# Patient Record
Sex: FEMALE | Race: WHITE | NOT HISPANIC OR LATINO | Employment: UNEMPLOYED | URBAN - NONMETROPOLITAN AREA
[De-identification: names, ages, dates, MRNs, and addresses within clinical notes are randomized per-mention and may not be internally consistent; named-entity substitution may affect disease eponyms.]

---

## 2019-06-26 PROBLEM — R12 HEARTBURN: Status: ACTIVE | Noted: 2019-06-26

## 2019-06-26 PROBLEM — R10.84 GENERALIZED ABDOMINAL PAIN: Status: ACTIVE | Noted: 2019-06-26

## 2019-06-26 PROBLEM — R14.0 BLOATING: Status: ACTIVE | Noted: 2019-06-26

## 2023-08-17 ENCOUNTER — TRANSCRIBE ORDERS (OUTPATIENT)
Dept: ADMINISTRATIVE | Facility: HOSPITAL | Age: 33
End: 2023-08-17
Payer: COMMERCIAL

## 2023-08-17 ENCOUNTER — LAB (OUTPATIENT)
Dept: LAB | Facility: HOSPITAL | Age: 33
End: 2023-08-17
Payer: COMMERCIAL

## 2023-08-17 ENCOUNTER — HOSPITAL ENCOUNTER (OUTPATIENT)
Dept: RESPIRATORY THERAPY | Facility: HOSPITAL | Age: 33
Discharge: HOME | End: 2023-08-17
Payer: COMMERCIAL

## 2023-08-17 DIAGNOSIS — E78.5 HYPERLIPIDEMIA, UNSPECIFIED HYPERLIPIDEMIA TYPE: ICD-10-CM

## 2023-08-17 DIAGNOSIS — R00.0 RACING HEART BEAT: ICD-10-CM

## 2023-08-17 DIAGNOSIS — E55.9 VITAMIN D DEFICIENCY: ICD-10-CM

## 2023-08-17 DIAGNOSIS — R00.0 RACING HEART BEAT: Primary | ICD-10-CM

## 2023-08-17 DIAGNOSIS — D50.9 IRON DEFICIENCY ANEMIA, UNSPECIFIED IRON DEFICIENCY ANEMIA TYPE: ICD-10-CM

## 2023-08-17 LAB
BASOPHILS # BLD AUTO: 0.03 10*3/MM3 (ref 0–0.2)
BASOPHILS NFR BLD AUTO: 0.3 % (ref 0–1.5)
CHOLEST SERPL-MCNC: 226 MG/DL (ref 0–200)
DEPRECATED RDW RBC AUTO: 38.2 FL (ref 37–54)
EOSINOPHIL # BLD AUTO: 0.15 10*3/MM3 (ref 0–0.4)
EOSINOPHIL NFR BLD AUTO: 1.7 % (ref 0.3–6.2)
ERYTHROCYTE [DISTWIDTH] IN BLOOD BY AUTOMATED COUNT: 11.9 % (ref 12.3–15.4)
FERRITIN SERPL-MCNC: 26.37 NG/ML (ref 13–150)
HBA1C MFR BLD: 5 % (ref 4.8–5.6)
HCT VFR BLD AUTO: 38.3 % (ref 34–46.6)
HDLC SERPL-MCNC: 65 MG/DL (ref 40–60)
HGB BLD-MCNC: 12.5 G/DL (ref 12–15.9)
IMM GRANULOCYTES # BLD AUTO: 0.04 10*3/MM3 (ref 0–0.05)
IMM GRANULOCYTES NFR BLD AUTO: 0.5 % (ref 0–0.5)
IRON 24H UR-MRATE: 126 MCG/DL (ref 37–145)
IRON SATN MFR SERPL: 21 % (ref 20–50)
LDLC SERPL CALC-MCNC: 138 MG/DL (ref 0–100)
LDLC/HDLC SERPL: 2.08 {RATIO}
LYMPHOCYTES # BLD AUTO: 1.75 10*3/MM3 (ref 0.7–3.1)
LYMPHOCYTES NFR BLD AUTO: 19.7 % (ref 19.6–45.3)
MCH RBC QN AUTO: 28.4 PG (ref 26.6–33)
MCHC RBC AUTO-ENTMCNC: 32.6 G/DL (ref 31.5–35.7)
MCV RBC AUTO: 87 FL (ref 79–97)
MONOCYTES # BLD AUTO: 0.43 10*3/MM3 (ref 0.1–0.9)
MONOCYTES NFR BLD AUTO: 4.8 % (ref 5–12)
NEUTROPHILS NFR BLD AUTO: 6.47 10*3/MM3 (ref 1.7–7)
NEUTROPHILS NFR BLD AUTO: 73 % (ref 42.7–76)
NRBC BLD AUTO-RTO: 0 /100 WBC (ref 0–0.2)
PLATELET # BLD AUTO: 420 10*3/MM3 (ref 140–450)
PMV BLD AUTO: 9.3 FL (ref 6–12)
RBC # BLD AUTO: 4.4 10*6/MM3 (ref 3.77–5.28)
TIBC SERPL-MCNC: 589 MCG/DL (ref 298–536)
TRANSFERRIN SERPL-MCNC: 395 MG/DL (ref 200–360)
TRIGL SERPL-MCNC: 128 MG/DL (ref 0–150)
TSH SERPL DL<=0.05 MIU/L-ACNC: 1.32 UIU/ML (ref 0.27–4.2)
VLDLC SERPL-MCNC: 23 MG/DL (ref 5–40)
WBC NRBC COR # BLD: 8.87 10*3/MM3 (ref 3.4–10.8)

## 2023-08-17 PROCEDURE — 93242 EXT ECG>48HR<7D RECORDING: CPT

## 2023-08-17 PROCEDURE — 84443 ASSAY THYROID STIM HORMONE: CPT

## 2023-08-17 PROCEDURE — 80061 LIPID PANEL: CPT

## 2023-08-17 PROCEDURE — 83540 ASSAY OF IRON: CPT

## 2023-08-17 PROCEDURE — 82728 ASSAY OF FERRITIN: CPT

## 2023-08-17 PROCEDURE — 36415 COLL VENOUS BLD VENIPUNCTURE: CPT

## 2023-08-17 PROCEDURE — 82306 VITAMIN D 25 HYDROXY: CPT

## 2023-08-17 PROCEDURE — 85025 COMPLETE CBC W/AUTO DIFF WBC: CPT

## 2023-08-17 PROCEDURE — 83036 HEMOGLOBIN GLYCOSYLATED A1C: CPT

## 2023-08-17 PROCEDURE — 84466 ASSAY OF TRANSFERRIN: CPT

## 2023-08-18 LAB — 25(OH)D3 SERPL-MCNC: 46.2 NG/ML (ref 30–100)

## 2023-12-12 ENCOUNTER — OFFICE VISIT (OUTPATIENT)
Dept: CARDIOLOGY | Facility: CLINIC | Age: 33
End: 2023-12-12
Payer: COMMERCIAL

## 2023-12-12 VITALS
DIASTOLIC BLOOD PRESSURE: 72 MMHG | SYSTOLIC BLOOD PRESSURE: 118 MMHG | OXYGEN SATURATION: 100 % | BODY MASS INDEX: 29.06 KG/M2 | RESPIRATION RATE: 16 BRPM | HEIGHT: 70 IN | WEIGHT: 203 LBS | HEART RATE: 89 BPM

## 2023-12-12 DIAGNOSIS — R00.2 PALPITATIONS: Primary | ICD-10-CM

## 2023-12-12 PROCEDURE — 93000 ELECTROCARDIOGRAM COMPLETE: CPT | Performed by: INTERNAL MEDICINE

## 2023-12-12 PROCEDURE — 99204 OFFICE O/P NEW MOD 45 MIN: CPT | Performed by: INTERNAL MEDICINE

## 2023-12-12 RX ORDER — FEXOFENADINE HCL 180 MG/1
180 TABLET ORAL DAILY
COMMUNITY

## 2023-12-12 NOTE — PROGRESS NOTES
Donna Adamson DO  Brielle Freed  1990  12/12/2023    Patient Active Problem List   Diagnosis    Generalized abdominal pain    Nausea    Bloating    Heartburn       Dear :    Subjective     Brielle Freed is a 33 y.o. female with the problems as listed above, presents    Chief complaint: Recurrent palpitations.    History of Present Illness: Ms. Freed is a pleasant 33-year-old  female with no history of known heart disease or significant cardiac arrhythmias, presents with complaints of having recurrent palpitations for the last few months.  She describes these episodes as being felt as rapid heartbeat and sometimes as a skipped heartbeat.  There is no associated dizziness or syncope.  She had a Holter monitor recently on 8/17/2023 that revealed predominantly sinus rhythm with heart rate ranging from 48 up to 165 bpm with rare PACs and PVCs with no significant bradycardia, AV block or long pauses.  Her symptoms of heart fluttering and racing during the recording correlated with sinus rhythm in the 70s.  She has some intermittent dyspnea with moderate exertion with no PND, orthopnea pedal edema.  She has some occasional localized left-sided chest pains that occur at random and resolve spontaneously.  These are rated as mild.  She admits to drinking about 1 cup of coffee a day.  She states that she has some intermittent anxiety.    Holter Monitor >48 Hrs Up To 7 Days     Accession Number: [id number]   Date of Study: 8/17/23   Ordering Provider: Katelynn Harden APRN   Clinical Indications: Racing heart beat [R00.0 (ICD-10-CM)], Iron deficiency anemia, unspecified iron deficiency anemia type [D50.9 (ICD-10-CM)], Hyperlipidemia, unspecified hyperlipidemia type [E78.5 (ICD-10-CM)], Vitamin D deficiency [E55.9 (ICD-10-CM)]        Interpreting Physicians  Performing Staff   Lee Jackson MD Tech: Nicole Edgar, RRT        Interpretation Summary     The patient was monitored for 7 days.     The predominant rhythm noted during the testing period was sinus rhythm.    Average HR: 80. Min HR: 48. Max HR: 165.    Rare PACs and PVCs    No significant bradycardia, AV block or long pauses.    Symptoms of heart fluttering and racing correlated with sinus rhythm in the 70s.                   Allergies   Allergen Reactions    Codeine Hives   :    Current Outpatient Medications:     fexofenadine (ALLEGRA) 180 MG tablet, Take 1 tablet by mouth Daily., Disp: , Rfl:     omeprazole (priLOSEC) 40 MG capsule, TAKE ONE CAPSULE BY MOUTH ONCE DAILY PRIOR TO THE SAME MEAL EACH DAY , Disp: 30 capsule, Rfl: 11    Past Medical History:   Diagnosis Date    GERD (gastroesophageal reflux disease)     Seasonal allergies      Past Surgical History:   Procedure Laterality Date    COLONOSCOPY N/A 6/27/2019    Procedure: COLONOSCOPY CPT CODE: 86069;  Surgeon: Vamsi Quispe MD;  Location: [facility];  Service: Gastroenterology    CYST REMOVAL      ENDOSCOPY N/A 6/27/2019    Procedure: ESOPHAGOGASTRODUODENOSCOPY WITH BIOPSY CPT CODE: 95540;  Surgeon: Vamsi Quispe MD;  Location: [facility];  Service: Gastroenterology     Family History   Problem Relation Age of Onset    Celiac disease Mother     Seizures Father     No Known Problems Sister     No Known Problems Brother     Heart disease Maternal Grandfather     Heart disease Paternal Grandmother     Heart attack Paternal Grandmother      Social History     Tobacco Use    Smoking status: Never    Smokeless tobacco: Never   Substance Use Topics    Alcohol use: No     Comment: occas    Drug use: Never     Review of Systems   Constitutional: Negative for chills, fever, malaise/fatigue, weight gain and weight loss.   HENT:  Positive for nosebleeds. Negative for congestion.    Eyes:  Positive for visual disturbance.   Cardiovascular:  Positive for palpitations. Negative for chest pain, irregular heartbeat, leg swelling and orthopnea.   Respiratory:  Negative for cough, hemoptysis and  "shortness of breath.    Hematologic/Lymphatic:        Hx anemia   Gastrointestinal:  Negative for abdominal pain, change in bowel habit, hematemesis, melena and vomiting.   Genitourinary:  Negative for dysuria, frequency and hematuria.   Neurological:  Positive for light-headedness, numbness and paresthesias. Negative for focal weakness, headaches and weakness.   Psychiatric/Behavioral:  The patient is nervous/anxious.      Objective   Blood pressure 118/72, pulse 89, resp. rate 16, height 177.8 cm (70\"), weight 92.1 kg (203 lb), SpO2 100%, not currently breastfeeding.  Body mass index is 29.13 kg/m².    Vitals reviewed.   Constitutional:       Appearance: Well-developed.   Eyes:      Conjunctiva/sclera: Conjunctivae normal.   HENT:      Head: Normocephalic.   Neck:      Thyroid: No thyromegaly.      Vascular: No JVD.      Trachea: No tracheal deviation.   Pulmonary:      Effort: No respiratory distress.      Breath sounds: Normal breath sounds. No wheezing. No rales.   Cardiovascular:      PMI at left midclavicular line. Normal rate. Regular rhythm. Normal S1. Normal S2.       Murmurs: There is no murmur.      No gallop.  No click. No rub.   Pulses:     Intact distal pulses.   Edema:     Peripheral edema absent.   Abdominal:      General: Bowel sounds are normal.      Palpations: Abdomen is soft. There is no abdominal mass.      Tenderness: There is no abdominal tenderness.   Musculoskeletal:      Cervical back: Normal range of motion and neck supple. Skin:     General: Skin is warm and dry.   Neurological:      Mental Status: Alert and oriented to person, place, and time.      Cranial Nerves: No cranial nerve deficit.         Lab Results   Component Value Date    WBC 8.87 08/17/2023    HGB 12.5 08/17/2023    HCT 38.3 08/17/2023     08/17/2023       Lab Results   Component Value Date    TSH 1.320 08/17/2023    TRIG 128 08/17/2023    HDL 65 (H) 08/17/2023     (H) 08/17/2023        ECG 12 " Lead    Date/Time: 12/12/2023 12:02 PM  Performed by: Lee Jackson MD    Authorized by: Lee Jackson MD  Previous ECG: no previous ECG available  Rhythm: sinus rhythm  Conduction: conduction normal  ST Segments: ST segments normal  T Waves: T waves normal          Assessment & Plan    Diagnosis Plan   1. Palpitations  Cardiac Event Monitor    Basic Metabolic Panel    Magnesium    TSH+Free T4          Recommendations  Orders Placed This Encounter   Procedures    Basic Metabolic Panel    Magnesium    TSH+Free T4    Cardiac Event Monitor    ECG 12 Lead      Since the Holter monitor was unrevealing, will try event monitor for 4 weeks and see if we could capture any infrequent cardiac arrhythmias.  Check BMP and magnesium level as well as TSH.      Return in about 2 months (around 2/12/2024).    As always, ,  I appreciate very much the opportunity to participate in the cardiovascular care of your patients. Please do not hesitate to call me with any questions with regards to Brielle Freed's evaluation and management.     With Best Regards,        Lee Jackson MD, FACC    Please note that portions of this note were completed with a voice recognition program.

## 2023-12-12 NOTE — LETTER
December 13, 2023     Donna Adamson DO  [address] [place] [address]    Patient: Brielle Freed   YOB: 1990   Date of Visit: 12/12/2023     Dear  :       Thank you for referring Brielle Freed to me for evaluation. Below are the relevant portions of my assessment and plan of care.    If you have questions, please do not hesitate to call me. I look forward to following Brielle along with you.      Sincerely,        Lee Jackson MD        CC: No Recipients    Lee Jackson MD  12/13/23 1437  Sign when Signing Visit  Donna Adamson DO  Brielle Freed  1990  12/12/2023    Patient Active Problem List   Diagnosis   • Generalized abdominal pain   • Nausea   • Bloating   • Heartburn       Dear :    Subjective    Brielle Freed is a 33 y.o. female with the problems as listed above, presents    Chief complaint: Recurrent palpitations.    History of Present Illness: Ms. Freed is a pleasant 33-year-old  female with no history of known heart disease or significant cardiac arrhythmias, presents with complaints of having recurrent palpitations for the last few months.  She describes these episodes as being felt as rapid heartbeat and sometimes as a skipped heartbeat.  There is no associated dizziness or syncope.  She had a Holter monitor recently on 8/17/2023 that revealed predominantly sinus rhythm with heart rate ranging from 48 up to 165 bpm with rare PACs and PVCs with no significant bradycardia, AV block or long pauses.  Her symptoms of heart fluttering and racing during the recording correlated with sinus rhythm in the 70s.  She has some intermittent dyspnea with moderate exertion with no PND, orthopnea pedal edema.  She has some occasional localized left-sided chest pains that occur at random and resolve spontaneously.  These are rated as mild.  She admits to drinking about 1 cup of coffee a day.  She states that she has some intermittent anxiety.    Holter Monitor  >48 Hrs Up To 7 Days     Accession Number: [id number]   Date of Study: 8/17/23   Ordering Provider: Katelynn Harden APRN   Clinical Indications: Racing heart beat [R00.0 (ICD-10-CM)], Iron deficiency anemia, unspecified iron deficiency anemia type [D50.9 (ICD-10-CM)], Hyperlipidemia, unspecified hyperlipidemia type [E78.5 (ICD-10-CM)], Vitamin D deficiency [E55.9 (ICD-10-CM)]        Interpreting Physicians  Performing Staff   Lee Jackson MD Tech: Nicole Edgar, RRT        Interpretation Summary   •  The patient was monitored for 7 days.  •  The predominant rhythm noted during the testing period was sinus rhythm.  •  Average HR: 80. Min HR: 48. Max HR: 165.  •  Rare PACs and PVCs  •  No significant bradycardia, AV block or long pauses.  •  Symptoms of heart fluttering and racing correlated with sinus rhythm in the 70s.                   Allergies   Allergen Reactions   • Codeine Hives   :    Current Outpatient Medications:   •  fexofenadine (ALLEGRA) 180 MG tablet, Take 1 tablet by mouth Daily., Disp: , Rfl:   •  omeprazole (priLOSEC) 40 MG capsule, TAKE ONE CAPSULE BY MOUTH ONCE DAILY PRIOR TO THE SAME MEAL EACH DAY , Disp: 30 capsule, Rfl: 11    Past Medical History:   Diagnosis Date   • GERD (gastroesophageal reflux disease)    • Seasonal allergies      Past Surgical History:   Procedure Laterality Date   • COLONOSCOPY N/A 6/27/2019    Procedure: COLONOSCOPY CPT CODE: 04056;  Surgeon: Vamsi Quispe MD;  Location: [facility] OR;  Service: Gastroenterology   • CYST REMOVAL     • ENDOSCOPY N/A 6/27/2019    Procedure: ESOPHAGOGASTRODUODENOSCOPY WITH BIOPSY CPT CODE: 31755;  Surgeon: Vamsi Quispe MD;  Location: [facility] OR;  Service: Gastroenterology     Family History   Problem Relation Age of Onset   • Celiac disease Mother    • Seizures Father    • No Known Problems Sister    • No Known Problems Brother    • Heart disease Maternal Grandfather    • Heart disease Paternal Grandmother    • Heart attack  "Paternal Grandmother      Social History     Tobacco Use   • Smoking status: Never   • Smokeless tobacco: Never   Substance Use Topics   • Alcohol use: No     Comment: occas   • Drug use: Never     Review of Systems   Constitutional: Negative for chills, fever, malaise/fatigue, weight gain and weight loss.   HENT:  Positive for nosebleeds. Negative for congestion.    Eyes:  Positive for visual disturbance.   Cardiovascular:  Positive for palpitations. Negative for chest pain, irregular heartbeat, leg swelling and orthopnea.   Respiratory:  Negative for cough, hemoptysis and shortness of breath.    Hematologic/Lymphatic:        Hx anemia   Gastrointestinal:  Negative for abdominal pain, change in bowel habit, hematemesis, melena and vomiting.   Genitourinary:  Negative for dysuria, frequency and hematuria.   Neurological:  Positive for light-headedness, numbness and paresthesias. Negative for focal weakness, headaches and weakness.   Psychiatric/Behavioral:  The patient is nervous/anxious.      Objective  Blood pressure 118/72, pulse 89, resp. rate 16, height 177.8 cm (70\"), weight 92.1 kg (203 lb), SpO2 100%, not currently breastfeeding.  Body mass index is 29.13 kg/m².    Vitals reviewed.   Constitutional:       Appearance: Well-developed.   Eyes:      Conjunctiva/sclera: Conjunctivae normal.   HENT:      Head: Normocephalic.   Neck:      Thyroid: No thyromegaly.      Vascular: No JVD.      Trachea: No tracheal deviation.   Pulmonary:      Effort: No respiratory distress.      Breath sounds: Normal breath sounds. No wheezing. No rales.   Cardiovascular:      PMI at left midclavicular line. Normal rate. Regular rhythm. Normal S1. Normal S2.       Murmurs: There is no murmur.      No gallop.  No click. No rub.   Pulses:     Intact distal pulses.   Edema:     Peripheral edema absent.   Abdominal:      General: Bowel sounds are normal.      Palpations: Abdomen is soft. There is no abdominal mass.      Tenderness: There " is no abdominal tenderness.   Musculoskeletal:      Cervical back: Normal range of motion and neck supple. Skin:     General: Skin is warm and dry.   Neurological:      Mental Status: Alert and oriented to person, place, and time.      Cranial Nerves: No cranial nerve deficit.         Lab Results   Component Value Date    WBC 8.87 08/17/2023    HGB 12.5 08/17/2023    HCT 38.3 08/17/2023     08/17/2023       Lab Results   Component Value Date    TSH 1.320 08/17/2023    TRIG 128 08/17/2023    HDL 65 (H) 08/17/2023     (H) 08/17/2023        ECG 12 Lead    Date/Time: 12/12/2023 12:02 PM  Performed by: Lee Jackson MD    Authorized by: Lee Jackson MD  Previous ECG: no previous ECG available  Rhythm: sinus rhythm  Conduction: conduction normal  ST Segments: ST segments normal  T Waves: T waves normal          Assessment & Plan   Diagnosis Plan   1. Palpitations  Cardiac Event Monitor    Basic Metabolic Panel    Magnesium    TSH+Free T4          Recommendations  Orders Placed This Encounter   Procedures   • Basic Metabolic Panel   • Magnesium   • TSH+Free T4   • Cardiac Event Monitor   • ECG 12 Lead      Since the Holter monitor was unrevealing, will try event monitor for 4 weeks and see if we could capture any infrequent cardiac arrhythmias.  Check BMP and magnesium level as well as TSH.      Return in about 2 months (around 2/12/2024).    As always, ,  I appreciate very much the opportunity to participate in the cardiovascular care of your patients. Please do not hesitate to call me with any questions with regards to Brielle Freed's evaluation and management.     With Best Regards,        Lee Jackson MD, [facility]    Please note that portions of this note were completed with a voice recognition program.

## 2023-12-14 ENCOUNTER — PATIENT ROUNDING (BHMG ONLY) (OUTPATIENT)
Dept: CARDIOLOGY | Facility: CLINIC | Age: 33
End: 2023-12-14
Payer: COMMERCIAL

## 2023-12-14 NOTE — PROGRESS NOTES
A [employer] message has been sent to the patient for patient rounding for [facility].

## 2024-01-09 ENCOUNTER — HOSPITAL ENCOUNTER (OUTPATIENT)
Dept: RESPIRATORY THERAPY | Facility: HOSPITAL | Age: 34
Discharge: HOME | End: 2024-01-09
Payer: COMMERCIAL

## 2024-01-09 ENCOUNTER — LAB (OUTPATIENT)
Dept: LAB | Facility: HOSPITAL | Age: 34
End: 2024-01-09
Payer: COMMERCIAL

## 2024-01-09 DIAGNOSIS — R00.2 PALPITATIONS: ICD-10-CM

## 2024-01-09 LAB
ANION GAP SERPL CALCULATED.3IONS-SCNC: 8.5 MMOL/L (ref 5–15)
BUN SERPL-MCNC: 10 MG/DL (ref 6–20)
BUN/CREAT SERPL: 13.5 (ref 7–25)
CALCIUM SPEC-SCNC: 9.8 MG/DL (ref 8.6–10.5)
CHLORIDE SERPL-SCNC: 107 MMOL/L (ref 98–107)
CO2 SERPL-SCNC: 22.5 MMOL/L (ref 22–29)
CREAT SERPL-MCNC: 0.74 MG/DL (ref 0.57–1)
EGFRCR SERPLBLD CKD-EPI 2021: 109 ML/MIN/1.73
GLUCOSE SERPL-MCNC: 83 MG/DL (ref 65–99)
MAGNESIUM SERPL-MCNC: 2.2 MG/DL (ref 1.6–2.6)
POTASSIUM SERPL-SCNC: 4.2 MMOL/L (ref 3.5–5.2)
SODIUM SERPL-SCNC: 138 MMOL/L (ref 136–145)
T4 FREE SERPL-MCNC: 1.37 NG/DL (ref 0.93–1.7)
TSH SERPL DL<=0.05 MIU/L-ACNC: 2.59 UIU/ML (ref 0.27–4.2)

## 2024-01-09 PROCEDURE — 36415 COLL VENOUS BLD VENIPUNCTURE: CPT

## 2024-01-09 PROCEDURE — 84439 ASSAY OF FREE THYROXINE: CPT

## 2024-01-09 PROCEDURE — 84443 ASSAY THYROID STIM HORMONE: CPT

## 2024-01-09 PROCEDURE — 80048 BASIC METABOLIC PNL TOTAL CA: CPT

## 2024-01-09 PROCEDURE — 83735 ASSAY OF MAGNESIUM: CPT

## 2024-02-15 ENCOUNTER — OFFICE VISIT (OUTPATIENT)
Dept: CARDIOLOGY | Facility: CLINIC | Age: 34
End: 2024-02-15
Payer: COMMERCIAL

## 2024-02-15 VITALS
HEIGHT: 70 IN | RESPIRATION RATE: 16 BRPM | SYSTOLIC BLOOD PRESSURE: 110 MMHG | HEART RATE: 89 BPM | BODY MASS INDEX: 28.75 KG/M2 | DIASTOLIC BLOOD PRESSURE: 69 MMHG | OXYGEN SATURATION: 98 % | WEIGHT: 200.8 LBS

## 2024-02-15 DIAGNOSIS — R00.2 PALPITATIONS: Primary | ICD-10-CM

## 2024-02-15 PROCEDURE — 99214 OFFICE O/P EST MOD 30 MIN: CPT | Performed by: INTERNAL MEDICINE

## 2024-02-15 RX ORDER — DESOGESTREL AND ETHINYL ESTRADIOL 21-5 (28)
1 KIT ORAL DAILY
COMMUNITY

## 2025-03-07 ENCOUNTER — OFFICE VISIT (OUTPATIENT)
Dept: CARDIOLOGY | Facility: CLINIC | Age: 35
End: 2025-03-07
Payer: COMMERCIAL

## 2025-03-07 VITALS
WEIGHT: 212 LBS | DIASTOLIC BLOOD PRESSURE: 74 MMHG | BODY MASS INDEX: 30.35 KG/M2 | SYSTOLIC BLOOD PRESSURE: 114 MMHG | HEIGHT: 70 IN | OXYGEN SATURATION: 100 % | HEART RATE: 87 BPM

## 2025-03-07 DIAGNOSIS — R42 DIZZINESS: ICD-10-CM

## 2025-03-07 DIAGNOSIS — R00.2 PALPITATIONS: Primary | ICD-10-CM

## 2025-03-07 PROCEDURE — 99214 OFFICE O/P EST MOD 30 MIN: CPT | Performed by: PHYSICIAN ASSISTANT

## 2025-03-07 RX ORDER — ACETAMINOPHEN AND CODEINE PHOSPHATE 120; 12 MG/5ML; MG/5ML
0.35 SOLUTION ORAL DAILY
COMMUNITY
Start: 2024-11-05

## 2025-03-07 RX ORDER — CETIRIZINE HYDROCHLORIDE 10 MG/1
10 TABLET ORAL DAILY
COMMUNITY

## 2025-03-07 NOTE — PROGRESS NOTES
Mile Winchester APRN  Brielle Freed  1990  03/07/2025    Patient Active Problem List   Diagnosis    Generalized abdominal pain    Nausea    Bloating    Heartburn    Palpitations, improved    Dizziness       Dear Mile Winchester APRN:    Subjective     History of Present Illness:    Chief Complaint   Patient presents with    Follow-up       Brielle Freed is a pleasant 34 y.o. female with a past medical history significant for palpitations otherwise no major cardiovascular history she is nondiabetic, non-smoker, nonhypertensive.  She comes in today for cardiology follow-up.      Brielle comes in today for follow-up since she was last seen she did have an echocardiogram performed at [facility] in [place].  She was informed that she had a PFO although results are not available to me currently.  Clinically her biggest concern is dizziness that is occurring on a near daily basis when they do occur she reports that they are lasting roughly 10 seconds in duration.  Most of these episodes are occurring after she has been standing for period of time and does believe quick head movements have exacerbated them.  She has worn a 30-day event monitor that was unremarkable.    Allergies   Allergen Reactions    Codeine Hives   :      Current Outpatient Medications:     cetirizine (zyrTEC) 10 MG tablet, Take 1 tablet by mouth Daily., Disp: , Rfl:     norethindrone (MICRONOR) 0.35 MG tablet, Take 1 tablet by mouth Daily., Disp: , Rfl:     omeprazole (priLOSEC) 40 MG capsule, TAKE ONE CAPSULE BY MOUTH ONCE DAILY PRIOR TO THE SAME MEAL EACH DAY , Disp: 30 capsule, Rfl: 11    desogestrel-ethinyl estradiol (KARIVA) 0.15-0.02/0.01 MG (21/5) per tablet, Take 1 tablet by mouth Daily. (Patient not taking: Reported on 3/7/2025), Disp: , Rfl:     fexofenadine (ALLEGRA) 180 MG tablet, Take 1 tablet by mouth Daily. (Patient not taking: Reported on 3/7/2025), Disp: , Rfl:     The following portions of the patient's history were  "reviewed and updated as appropriate: allergies, current medications, past family history, past medical history, past social history, past surgical history and problem list.    Social History     Tobacco Use    Smoking status: Never    Smokeless tobacco: Never   Substance Use Topics    Alcohol use: No     Comment: occas    Drug use: Never         Objective   Vitals:    03/07/25 1032   BP: 114/74   BP Location: Left arm   Patient Position: Sitting   Cuff Size: Adult   Pulse: 87   SpO2: 100%   Weight: 96.2 kg (212 lb)   Height: 177.8 cm (70\")     Body mass index is 30.42 kg/m².    ROS    Constitutional:       General: Not in acute distress.     Appearance: Healthy appearance. Well-developed and not in distress. Not diaphoretic.   Eyes:      Conjunctiva/sclera: Conjunctivae normal.      Pupils: Pupils are equal, round, and reactive to light.   HENT:      Head: Normocephalic and atraumatic.   Neck:      Vascular: No carotid bruit or JVD.   Pulmonary:      Effort: Pulmonary effort is normal. No respiratory distress.      Breath sounds: Normal breath sounds.   Cardiovascular:      Normal rate. Regular rhythm.   Edema:     Peripheral edema absent.   Skin:     General: Skin is cool.   Neurological:      Mental Status: Alert, oriented to person, place, and time and oriented to person, place and time.         Lab Results   Component Value Date     01/09/2024    K 4.2 01/09/2024     01/09/2024    CO2 22.5 01/09/2024    BUN 10 01/09/2024    CREATININE 0.74 01/09/2024    GLUCOSE 83 01/09/2024    CALCIUM 9.8 01/09/2024    AST 19 06/11/2019    ALT 15 06/11/2019    ALKPHOS 56 06/11/2019     No results found for: \"CKTOTAL\"  Lab Results   Component Value Date    WBC 8.87 08/17/2023    HGB 12.5 08/17/2023    HCT 38.3 08/17/2023     08/17/2023     No results found for: \"INR\"  Lab Results   Component Value Date    MG 2.2 01/09/2024     Lab Results   Component Value Date    TSH 2.590 01/09/2024    TRIG 128 08/17/2023    " "HDL 65 (H) 08/17/2023     (H) 08/17/2023      No results found for: \"BNP\"    During this visit the following were done:  Labs Reviewed []    Labs Ordered []    Radiology Reports Reviewed []    Radiology Ordered []    PCP Records Reviewed []    Referring Provider Records Reviewed []    ER Records Reviewed []    Hospital Records Reviewed []    History Obtained From Family []    Radiology Images Reviewed []    Other Reviewed []    Records Requested []       Procedures    Assessment & Plan    Diagnosis Plan   1. Palpitations, improved        2. Dizziness                 Recommendations:  Dizziness  Currently differential diagnosis includes orthostatic hypotension, general hypotension, or vertigo.  Less likely is arrhythmia induced she denies any palpitations and completed 30-day monitor that was reportedly unremarkable.  I did perform orthostatics in the office and she did have a mild drop in systolic pressure from 114 mmHg to 108 and she did report some mild dizziness when returning back to the chair.  I did encourage her to try to check blood pressure diligently particularly if she is able to check during episodes of dizziness.  I encouraged good hydration and even drinking sports drinks with sodium to improve her symptoms.  PFO  Reviewed echocardiogram report from [facility] which reveals medium sized PFO.  Will review case with cardiologist to determine if AMADO is necessary.  She did bring CD with imaging I asked her to drop this off at the diagnostic center so we can upload these images to review    No follow-ups on file.    As always, I appreciate very much the opportunity to participate in the cardiovascular care of your patients.      With Best Regards,    Jacinto Enrique PA-C          "

## 2025-03-13 ENCOUNTER — TELEPHONE (OUTPATIENT)
Dept: CARDIOLOGY | Facility: CLINIC | Age: 35
End: 2025-03-13

## 2025-03-13 NOTE — TELEPHONE ENCOUNTER
Patient called and said that Jacinto had her drop a disk of her echo off to the hospital and said that he would call her about the results. Can someone check on this for the patient?     Thanks!

## 2025-05-30 ENCOUNTER — TELEPHONE (OUTPATIENT)
Dept: CARDIOLOGY | Facility: CLINIC | Age: 35
End: 2025-05-30
Payer: COMMERCIAL

## 2025-07-11 ENCOUNTER — TELEPHONE (OUTPATIENT)
Dept: CARDIOLOGY | Facility: CLINIC | Age: 35
End: 2025-07-11
Payer: COMMERCIAL

## 2025-07-17 ENCOUNTER — OFFICE VISIT (OUTPATIENT)
Dept: CARDIOLOGY | Facility: CLINIC | Age: 35
End: 2025-07-17
Payer: COMMERCIAL

## 2025-07-17 VITALS
HEART RATE: 75 BPM | RESPIRATION RATE: 18 BRPM | OXYGEN SATURATION: 97 % | BODY MASS INDEX: 30.67 KG/M2 | HEIGHT: 70 IN | DIASTOLIC BLOOD PRESSURE: 64 MMHG | SYSTOLIC BLOOD PRESSURE: 103 MMHG | WEIGHT: 214.2 LBS

## 2025-07-17 DIAGNOSIS — Q21.12 PFO (PATENT FORAMEN OVALE): ICD-10-CM

## 2025-07-17 DIAGNOSIS — R00.2 PALPITATIONS: Primary | ICD-10-CM

## 2025-07-17 PROCEDURE — 93000 ELECTROCARDIOGRAM COMPLETE: CPT | Performed by: PHYSICIAN ASSISTANT

## 2025-07-17 PROCEDURE — 99214 OFFICE O/P EST MOD 30 MIN: CPT | Performed by: PHYSICIAN ASSISTANT

## 2025-07-17 NOTE — PROGRESS NOTES
Mile Winchester APRN  Brielle Freed  1990  07/17/2025    Patient Active Problem List   Diagnosis    Generalized abdominal pain    Nausea    Bloating    Heartburn    Palpitations, improved    Dizziness       Dear Mile Winchester APRN:    Subjective     History of Present Illness:    Chief Complaint   Patient presents with    Palpitations, improved       Brielle Freed is a pleasant 35 y.o. female with a past medical history significant for reportedly medium size PFO on echocardiogram at outside facility and dyslipidemia.  History of Present Illness  The patient presents for evaluation of patent foramen ovale.     Brielle comes in today reporting that she has been stable and denies any chest pains or shortness of breath.  She reports only rare episodes of palpitations not affecting ADLs and states these are mild.  Blood pressure is very well-controlled today.  Denies any further dizziness that was reported at last visit and denies any syncope or near syncope.           Allergies   Allergen Reactions    Codeine Hives   :      Current Outpatient Medications:     cetirizine (zyrTEC) 10 MG tablet, Take 1 tablet by mouth Daily., Disp: , Rfl:     norethindrone (MICRONOR) 0.35 MG tablet, Take 1 tablet by mouth Daily., Disp: , Rfl:     omeprazole (priLOSEC) 40 MG capsule, TAKE ONE CAPSULE BY MOUTH ONCE DAILY PRIOR TO THE SAME MEAL EACH DAY , Disp: 30 capsule, Rfl: 11    desogestrel-ethinyl estradiol (KARIVA) 0.15-0.02/0.01 MG (21/5) per tablet, Take 1 tablet by mouth Daily. (Patient not taking: Reported on 7/17/2025), Disp: , Rfl:     fexofenadine (ALLEGRA) 180 MG tablet, Take 1 tablet by mouth Daily. (Patient not taking: Reported on 7/17/2025), Disp: , Rfl:     The following portions of the patient's history were reviewed and updated as appropriate: allergies, current medications, past family history, past medical history, past social history, past surgical history and problem list.    Social History     Tobacco  "Use    Smoking status: Never    Smokeless tobacco: Never   Vaping Use    Vaping status: Never Used   Substance Use Topics    Alcohol use: No     Comment: occas    Drug use: No         Objective   Vitals:    07/17/25 1126   BP: 103/64   BP Location: Left arm   Patient Position: Sitting   Cuff Size: Adult   Pulse: 75   Resp: 18   SpO2: 97%   Weight: 97.2 kg (214 lb 3.2 oz)   Height: 177.8 cm (70\")     Body mass index is 30.73 kg/m².    ROS    Constitutional:       General: Not in acute distress.     Appearance: Healthy appearance. Well-developed and not in distress. Not diaphoretic.   Eyes:      Conjunctiva/sclera: Conjunctivae normal.      Pupils: Pupils are equal, round, and reactive to light.   HENT:      Head: Normocephalic and atraumatic.   Neck:      Vascular: No carotid bruit or JVD.   Pulmonary:      Effort: Pulmonary effort is normal. No respiratory distress.      Breath sounds: Normal breath sounds.   Cardiovascular:      Normal rate. Regular rhythm.   Edema:     Peripheral edema absent.   Skin:     General: Skin is cool.   Neurological:      Mental Status: Alert, oriented to person, place, and time and oriented to person, place and time.         Lab Results   Component Value Date     01/09/2024    K 4.2 01/09/2024     01/09/2024    CO2 22.5 01/09/2024    BUN 10 01/09/2024    CREATININE 0.74 01/09/2024    GLUCOSE 83 01/09/2024    CALCIUM 9.8 01/09/2024    AST 19 06/11/2019    ALT 15 06/11/2019    ALKPHOS 56 06/11/2019     No results found for: \"CKTOTAL\"  Lab Results   Component Value Date    WBC 8.87 08/17/2023    HGB 12.5 08/17/2023    HCT 38.3 08/17/2023     08/17/2023     No results found for: \"INR\"  Lab Results   Component Value Date    MG 2.2 01/09/2024     Lab Results   Component Value Date    TSH 2.590 01/09/2024    TRIG 128 08/17/2023    HDL 65 (H) 08/17/2023     (H) 08/17/2023      No results found for: \"BNP\"    During this visit the following were done:  Labs Reviewed []  "   Labs Ordered []    Radiology Reports Reviewed []    Radiology Ordered []    PCP Records Reviewed []    Referring Provider Records Reviewed []    ER Records Reviewed []    Hospital Records Reviewed []    History Obtained From Family []    Radiology Images Reviewed []    Other Reviewed []    Records Requested []         ECG 12 Lead    Date/Time: 7/17/2025 11:44 AM  Performed by: Jacinto Enrique PA-C    Authorized by: Jacinto Enrique PA-C  Comparison: compared with previous ECG   Similar to previous ECG  Rhythm: sinus rhythm  Conduction: conduction normal    Clinical impression: normal ECG          Assessment & Plan    Diagnosis Plan   1. Palpitations                 Assessment & Plan  1. Patent foramen ovale:  - Will order echocardiogram with bubble for further evaluation currently completely asymptomatic no definitive indication for closure.    2. Elevated cholesterol:  - LDL: 130, slightly elevated  - Advised to incorporate fish into diet weekly  - Recommended fruits and green vegetables  - Regular exercise recommended  - I also encouraged her to reduce diet and processed meats such as pork      No follow-ups on file.    As always, I appreciate very much the opportunity to participate in the cardiovascular care of your patients.      With Best Regards,    Jacinto Enrique PA-C    Patient or patient representative verbalized consent for the use of Ambient Listening during the visit with  Jacinto Enrique PA-C for chart documentation. 7/17/2025  11:47 EDT

## 2025-07-31 ENCOUNTER — HOSPITAL ENCOUNTER (OUTPATIENT)
Dept: CARDIOLOGY | Facility: HOSPITAL | Age: 35
Discharge: HOME | End: 2025-07-31
Admitting: PHYSICIAN ASSISTANT
Payer: COMMERCIAL

## 2025-07-31 DIAGNOSIS — Q21.12 PFO (PATENT FORAMEN OVALE): ICD-10-CM

## 2025-07-31 LAB
AORTIC DIMENSIONLESS INDEX: 0.6 (DI)
AV MEAN PRESS GRAD SYS DOP V1V2: 3 MMHG
AV VMAX SYS DOP: 116 CM/SEC
BH CV ECHO MEAS - AO MAX PG: 5.4 MMHG
BH CV ECHO MEAS - AO ROOT DIAM: 2.9 CM
BH CV ECHO MEAS - AO V2 VTI: 24.3 CM
BH CV ECHO MEAS - AVA(I,D): 2.1 CM2
BH CV ECHO MEAS - EDV(CUBED): 74.1 ML
BH CV ECHO MEAS - EDV(MOD-SP4): 45.4 ML
BH CV ECHO MEAS - EF(MOD-SP4): 59.9 %
BH CV ECHO MEAS - ESV(CUBED): 35.9 ML
BH CV ECHO MEAS - ESV(MOD-SP4): 18.2 ML
BH CV ECHO MEAS - FS: 21.4 %
BH CV ECHO MEAS - IVS/LVPW: 0.86 CM
BH CV ECHO MEAS - IVSD: 1.2 CM
BH CV ECHO MEAS - LA DIMENSION: 2.2 CM
BH CV ECHO MEAS - LAT PEAK E' VEL: 12.3 CM/SEC
BH CV ECHO MEAS - LV DIASTOLIC VOL/BSA (35-75): 21.1 CM2
BH CV ECHO MEAS - LV MASS(C)D: 200.6 GRAMS
BH CV ECHO MEAS - LV MAX PG: 2.7 MMHG
BH CV ECHO MEAS - LV MEAN PG: 1 MMHG
BH CV ECHO MEAS - LV SYSTOLIC VOL/BSA (12-30): 8.5 CM2
BH CV ECHO MEAS - LV V1 MAX: 82.1 CM/SEC
BH CV ECHO MEAS - LV V1 VTI: 14.7 CM
BH CV ECHO MEAS - LVIDD: 4.2 CM
BH CV ECHO MEAS - LVIDS: 3.3 CM
BH CV ECHO MEAS - LVOT AREA: 3.5 CM2
BH CV ECHO MEAS - LVOT DIAM: 2.1 CM
BH CV ECHO MEAS - LVPWD: 1.4 CM
BH CV ECHO MEAS - MED PEAK E' VEL: 11.6 CM/SEC
BH CV ECHO MEAS - MV A MAX VEL: 58.1 CM/SEC
BH CV ECHO MEAS - MV E MAX VEL: 103 CM/SEC
BH CV ECHO MEAS - MV E/A: 1.77
BH CV ECHO MEAS - PA ACC TIME: 0.14 SEC
BH CV ECHO MEAS - PA V2 MAX: 122 CM/SEC
BH CV ECHO MEAS - RAP SYSTOLE: 10 MMHG
BH CV ECHO MEAS - RVSP: 32.8 MMHG
BH CV ECHO MEAS - SV(LVOT): 50.9 ML
BH CV ECHO MEAS - SV(MOD-SP4): 27.2 ML
BH CV ECHO MEAS - SVI(LVOT): 23.7 ML/M2
BH CV ECHO MEAS - SVI(MOD-SP4): 12.7 ML/M2
BH CV ECHO MEAS - TR MAX PG: 22.8 MMHG
BH CV ECHO MEAS - TR MAX VEL: 239 CM/SEC
BH CV ECHO MEASUREMENTS AVERAGE E/E' RATIO: 8.62

## 2025-07-31 PROCEDURE — 93306 TTE W/DOPPLER COMPLETE: CPT
